# Patient Record
Sex: MALE | Race: OTHER | Employment: FULL TIME | URBAN - METROPOLITAN AREA
[De-identification: names, ages, dates, MRNs, and addresses within clinical notes are randomized per-mention and may not be internally consistent; named-entity substitution may affect disease eponyms.]

---

## 2022-04-12 ENCOUNTER — OFFICE VISIT (OUTPATIENT)
Dept: PRIMARY CARE CLINIC | Age: 42
End: 2022-04-12
Payer: COMMERCIAL

## 2022-04-12 VITALS
HEART RATE: 70 BPM | SYSTOLIC BLOOD PRESSURE: 130 MMHG | BODY MASS INDEX: 23.72 KG/M2 | DIASTOLIC BLOOD PRESSURE: 92 MMHG | OXYGEN SATURATION: 98 % | WEIGHT: 179 LBS | HEIGHT: 73 IN

## 2022-04-12 DIAGNOSIS — L73.9 FOLLICULITIS: ICD-10-CM

## 2022-04-12 DIAGNOSIS — R39.11 URINARY HESITANCY: ICD-10-CM

## 2022-04-12 DIAGNOSIS — I10 PRIMARY HYPERTENSION: Primary | ICD-10-CM

## 2022-04-12 DIAGNOSIS — N52.9 ERECTILE DYSFUNCTION, UNSPECIFIED ERECTILE DYSFUNCTION TYPE: ICD-10-CM

## 2022-04-12 PROCEDURE — 99204 OFFICE O/P NEW MOD 45 MIN: CPT | Performed by: NURSE PRACTITIONER

## 2022-04-12 RX ORDER — LISINOPRIL 5 MG/1
5 TABLET ORAL DAILY
Qty: 30 TABLET | Refills: 5 | Status: SHIPPED | OUTPATIENT
Start: 2022-04-12 | End: 2022-10-07

## 2022-04-12 RX ORDER — TAMSULOSIN HYDROCHLORIDE 0.4 MG/1
0.4 CAPSULE ORAL DAILY
Qty: 90 CAPSULE | Refills: 1 | Status: SHIPPED | OUTPATIENT
Start: 2022-04-12 | End: 2022-10-17

## 2022-04-12 RX ORDER — KETOCONAZOLE 20 MG/ML
SHAMPOO TOPICAL
Qty: 1 EACH | Refills: 0 | Status: SHIPPED | OUTPATIENT
Start: 2022-04-12

## 2022-04-12 SDOH — ECONOMIC STABILITY: TRANSPORTATION INSECURITY
IN THE PAST 12 MONTHS, HAS THE LACK OF TRANSPORTATION KEPT YOU FROM MEDICAL APPOINTMENTS OR FROM GETTING MEDICATIONS?: NO

## 2022-04-12 SDOH — ECONOMIC STABILITY: FOOD INSECURITY: WITHIN THE PAST 12 MONTHS, THE FOOD YOU BOUGHT JUST DIDN'T LAST AND YOU DIDN'T HAVE MONEY TO GET MORE.: NEVER TRUE

## 2022-04-12 SDOH — ECONOMIC STABILITY: FOOD INSECURITY: WITHIN THE PAST 12 MONTHS, YOU WORRIED THAT YOUR FOOD WOULD RUN OUT BEFORE YOU GOT MONEY TO BUY MORE.: NEVER TRUE

## 2022-04-12 SDOH — ECONOMIC STABILITY: TRANSPORTATION INSECURITY
IN THE PAST 12 MONTHS, HAS LACK OF TRANSPORTATION KEPT YOU FROM MEETINGS, WORK, OR FROM GETTING THINGS NEEDED FOR DAILY LIVING?: NO

## 2022-04-12 ASSESSMENT — PATIENT HEALTH QUESTIONNAIRE - PHQ9
SUM OF ALL RESPONSES TO PHQ QUESTIONS 1-9: 0
SUM OF ALL RESPONSES TO PHQ9 QUESTIONS 1 & 2: 0
SUM OF ALL RESPONSES TO PHQ QUESTIONS 1-9: 0
1. LITTLE INTEREST OR PLEASURE IN DOING THINGS: 0
2. FEELING DOWN, DEPRESSED OR HOPELESS: 0

## 2022-04-12 ASSESSMENT — SOCIAL DETERMINANTS OF HEALTH (SDOH): HOW HARD IS IT FOR YOU TO PAY FOR THE VERY BASICS LIKE FOOD, HOUSING, MEDICAL CARE, AND HEATING?: NOT HARD AT ALL

## 2022-04-12 NOTE — PROGRESS NOTES
Forest Gutierrez (:  1980) is a 36 y.o. male,New patient, here for evaluation of the following chief complaint(s):  New Patient (NEW PT), Erectile Dysfunction (PT HAS ED AND HAS AN ENLARGED PROSTATE), and Other (PT HAS FOLLICULTIS ON HIS FACE AND HEAD )    HTN: Couple year history of hypertension. He is a  so has been on multiple following medications: Losartan, metoprolol, amlodipine. Had good blood pressure control with all of them, only side effect was erectile dysfunction. He was a heavy drinker but has recently cut way back to only drinking a few drinks on the weekends. He states he has been off medications for a couple of months and it has been pretty well controlled. Has been checking at home. SBPS 130s but DBPs 80-90s. Denies chest pain, SOB, numbness, tingling, headaches, leg swelling. Pt had blood work completed     ED: Biggest complaint is the ED. Did do a virtual visit with a urologist in January. Has been on max doses of Viagra and Cialis with minimal improvement. Girlfriend present during visits states he has an enlarged prostate but there is no documentation of this. Pt does have some urinary hesitancy and some issues with flow. Skin issues: reports recurrent issue of folliculitis on his head/scalp and face. Unsure of how long it has been present, but girlfriend has been helping for the last two weeks. They have been using a folliculitis and acne wash which has helped a lot. States two weeks ago it was red, inflamed, and pustular. Denies history of issues with acne or this every occurring before. ASSESSMENT/PLAN:  1. Primary hypertension  -     lisinopril (PRINIVIL;ZESTRIL) 5 MG tablet; Take 1 tablet by mouth daily, Disp-30 tablet, R-5Normal  2. Urinary hesitancy  -     tamsulosin (FLOMAX) 0.4 MG capsule; Take 1 capsule by mouth daily, Disp-90 capsule, R-1Normal  -     [facility] - Conchita Wolf MD, Urology, [facility]  3.  Folliculitis  -     External Referral To Dermatology  4. Erectile dysfunction, unspecified erectile dysfunction type  -     AFL - Raymond Menchaca MD, Urology, [facility] [place]      Return in about 3 months (around 7/12/2022) for Med Check, HTN follow-up. Subjective   SUBJECTIVE/OBJECTIVE:    Review of Systems   Constitutional: Negative for appetite change, chills, diaphoresis, fatigue and fever. Respiratory: Negative for cough and shortness of breath. Cardiovascular: Negative for chest pain. Gastrointestinal: Negative for abdominal pain, constipation, diarrhea and vomiting. Genitourinary: Positive for difficulty urinating. Negative for decreased urine volume, penile discharge, penile pain, penile swelling and urgency. Skin: Positive for rash. Neurological: Negative for weakness, numbness and headaches. Psychiatric/Behavioral: Negative for sleep disturbance. The patient is not nervous/anxious. Objective   Physical Exam  Vitals and nursing note reviewed. Constitutional:       Appearance: Normal appearance. He is well-developed and well-groomed. HENT:      Head:        Comments: On bilateral cheeks, similar deep scarring and tunneling. One erythematous inflamed papule. Neck:      Vascular: No carotid bruit. Cardiovascular:      Rate and Rhythm: Normal rate and regular rhythm. Pulses: Normal pulses. Heart sounds: Normal heart sounds, S1 normal and S2 normal.   Pulmonary:      Effort: Pulmonary effort is normal.      Breath sounds: Normal breath sounds and air entry. Neurological:      Mental Status: He is alert. Psychiatric:         Behavior: Behavior is cooperative. An electronic signature was used to authenticate this note.     --Brandie Estevez, APRN - CNP

## 2022-04-13 ASSESSMENT — ENCOUNTER SYMPTOMS
CONSTIPATION: 0
ABDOMINAL PAIN: 0
VOMITING: 0
DIARRHEA: 0
COUGH: 0
SHORTNESS OF BREATH: 0

## 2022-06-03 ENCOUNTER — OFFICE VISIT (OUTPATIENT)
Dept: PRIMARY CARE CLINIC | Age: 42
End: 2022-06-03
Payer: COMMERCIAL

## 2022-06-03 VITALS — SYSTOLIC BLOOD PRESSURE: 130 MMHG | OXYGEN SATURATION: 98 % | HEART RATE: 67 BPM | DIASTOLIC BLOOD PRESSURE: 84 MMHG

## 2022-06-03 DIAGNOSIS — I10 PRIMARY HYPERTENSION: ICD-10-CM

## 2022-06-03 DIAGNOSIS — R53.83 FATIGUE, UNSPECIFIED TYPE: ICD-10-CM

## 2022-06-03 DIAGNOSIS — Z13.1 SCREENING FOR DIABETES MELLITUS: ICD-10-CM

## 2022-06-03 DIAGNOSIS — N52.9 ERECTILE DYSFUNCTION, UNSPECIFIED ERECTILE DYSFUNCTION TYPE: Primary | ICD-10-CM

## 2022-06-03 DIAGNOSIS — N52.9 ERECTILE DYSFUNCTION, UNSPECIFIED ERECTILE DYSFUNCTION TYPE: ICD-10-CM

## 2022-06-03 LAB
FOLATE: 8.6 NG/ML (ref 4.78–24.2)
TSH REFLEX: 1.47 UIU/ML (ref 0.27–4.2)
VITAMIN B-12: 527 PG/ML (ref 211–911)

## 2022-06-03 PROCEDURE — 99214 OFFICE O/P EST MOD 30 MIN: CPT | Performed by: NURSE PRACTITIONER

## 2022-06-03 ASSESSMENT — ENCOUNTER SYMPTOMS
CONSTIPATION: 0
DIARRHEA: 0
ABDOMINAL PAIN: 0
SHORTNESS OF BREATH: 0
COUGH: 0
NAUSEA: 0
VOMITING: 0

## 2022-06-03 NOTE — PROGRESS NOTES
Todd Pisano (:  1980) is a 36 y.o. male,Established patient, here for evaluation of the following chief complaint(s):  Medication Check and Hypertension    HTN: going well. BPs have been stable. No side effects from medications. Feeling good. ED: Still present. Still no libido and poor energy most days. No change since adding flomax and changing to lisinopril. Has appt with Urology today. Folliculitis: a little better with changes to treatment, not fully gone, but improved. Will get bad again when he doesn't use shampoos for a couple of days. Has not scheduled appt with derm, need to determine if they are in network. ASSESSMENT/PLAN:  1. Erectile dysfunction, unspecified erectile dysfunction type  -     Testosterone; Future  -     Testosterone, free, total; Future  -     Sex Hormone Binding Globulin; Future  -     TSH with Reflex; Future  -     Hemoglobin A1C; Future  -     Vitamin B12 & Folate; Future  2. Primary hypertension  -     TSH with Reflex; Future  3. Screening for diabetes mellitus  -     Hemoglobin A1C; Future  4. Fatigue, unspecified type  -     Testosterone; Future  -     Testosterone, free, total; Future  -     Sex Hormone Binding Globulin; Future  -     TSH with Reflex; Future  -     Hemoglobin A1C; Future  -     Vitamin B12 & Folate; Future      Return in about 6 months (around 12/3/2022) for HTN follow-up. -Going up to lab today to have blood drawn.   -Will continue current medication regimen     Subjective   SUBJECTIVE/OBJECTIVE:    Review of Systems   Constitutional: Positive for fatigue. Negative for chills, diaphoresis and fever. Respiratory: Negative for cough and shortness of breath. Cardiovascular: Negative for chest pain, palpitations and leg swelling. Gastrointestinal: Negative for abdominal pain, constipation, diarrhea, nausea and vomiting.    Genitourinary: Negative for decreased urine volume, genital sores, hematuria, penile discharge, penile pain, penile swelling, scrotal swelling, testicular pain and urgency. Objective   Physical Exam  Vitals and nursing note reviewed. Constitutional:       Appearance: Normal appearance. He is well-developed and well-groomed. Neck:      Vascular: No carotid bruit. Cardiovascular:      Rate and Rhythm: Normal rate and regular rhythm. Pulses: Normal pulses. Heart sounds: Normal heart sounds, S1 normal and S2 normal.   Pulmonary:      Effort: Pulmonary effort is normal.      Breath sounds: Normal breath sounds and air entry. Neurological:      Mental Status: He is alert. Psychiatric:         Behavior: Behavior is cooperative. An electronic signature was used to authenticate this note.     --BRYAN Spivey - CNP

## 2022-06-04 LAB
ESTIMATED AVERAGE GLUCOSE: 114 MG/DL
HBA1C MFR BLD: 5.6 %

## 2022-06-05 LAB — SEX HORMONE BINDING GLOBULIN: 40 NMOL/L (ref 17–56)

## 2022-06-07 LAB
SEX HORMONE BINDING GLOBULIN: 37 NMOL/L (ref 11–80)
TESTOSTERONE FREE-NONMALE: 123.7 PG/ML (ref 47–244)
TESTOSTERONE TOTAL: 610 NG/DL (ref 220–1000)

## 2022-06-09 DIAGNOSIS — N52.9 ERECTILE DYSFUNCTION, UNSPECIFIED ERECTILE DYSFUNCTION TYPE: Primary | ICD-10-CM

## 2022-06-09 DIAGNOSIS — N52.9 ERECTILE DYSFUNCTION, UNSPECIFIED ERECTILE DYSFUNCTION TYPE: ICD-10-CM

## 2022-06-09 DIAGNOSIS — R53.83 FATIGUE, UNSPECIFIED TYPE: ICD-10-CM

## 2022-06-09 LAB
FOLLICLE STIMULATING HORMONE: 2.6 MIU/ML
LUTEINIZING HORMONE: 6.7 MIU/ML
PROLACTIN: 3.8 NG/ML

## 2022-06-20 LAB
ESTRADIOL LEVEL: 27.1 PG/ML (ref 10–42)
ESTROGEN TOTAL: 61.6 PG/ML (ref 19–69)
ESTRONE: 34.5 PG/ML (ref 9–36)

## 2022-10-07 DIAGNOSIS — I10 PRIMARY HYPERTENSION: ICD-10-CM

## 2022-10-07 RX ORDER — LISINOPRIL 5 MG/1
TABLET ORAL
Qty: 90 TABLET | Refills: 0 | Status: SHIPPED | OUTPATIENT
Start: 2022-10-07 | End: 2022-11-22 | Stop reason: SDUPTHER

## 2022-10-16 DIAGNOSIS — R39.11 URINARY HESITANCY: ICD-10-CM

## 2022-10-17 RX ORDER — TAMSULOSIN HYDROCHLORIDE 0.4 MG/1
CAPSULE ORAL
Qty: 90 CAPSULE | Refills: 1 | Status: SHIPPED | OUTPATIENT
Start: 2022-10-17

## 2022-11-22 ENCOUNTER — OFFICE VISIT (OUTPATIENT)
Dept: PRIMARY CARE CLINIC | Age: 42
End: 2022-11-22

## 2022-11-22 VITALS
BODY MASS INDEX: 21.47 KG/M2 | OXYGEN SATURATION: 97 % | WEIGHT: 162 LBS | SYSTOLIC BLOOD PRESSURE: 110 MMHG | HEART RATE: 76 BPM | DIASTOLIC BLOOD PRESSURE: 64 MMHG

## 2022-11-22 DIAGNOSIS — N52.9 ERECTILE DYSFUNCTION, UNSPECIFIED ERECTILE DYSFUNCTION TYPE: ICD-10-CM

## 2022-11-22 DIAGNOSIS — R00.2 PALPITATIONS: ICD-10-CM

## 2022-11-22 DIAGNOSIS — I10 PRIMARY HYPERTENSION: Primary | ICD-10-CM

## 2022-11-22 PROCEDURE — 3078F DIAST BP <80 MM HG: CPT | Performed by: NURSE PRACTITIONER

## 2022-11-22 PROCEDURE — 3074F SYST BP LT 130 MM HG: CPT | Performed by: NURSE PRACTITIONER

## 2022-11-22 PROCEDURE — 99213 OFFICE O/P EST LOW 20 MIN: CPT | Performed by: NURSE PRACTITIONER

## 2022-11-22 RX ORDER — LISINOPRIL 5 MG/1
TABLET ORAL
Qty: 90 TABLET | Refills: 0 | Status: SHIPPED | OUTPATIENT
Start: 2022-11-22

## 2022-11-22 ASSESSMENT — ENCOUNTER SYMPTOMS
CONSTIPATION: 0
COUGH: 1
SHORTNESS OF BREATH: 0
VOMITING: 0
ABDOMINAL PAIN: 0
NAUSEA: 0
DIARRHEA: 0

## 2022-11-22 NOTE — PATIENT INSTRUCTIONS
[address] [facility]   [address]  Open 7 am-5 pm Mon.-Fri., Sat. 8 am -12 pm    [address]  [address]  [address]  [facility]  Open 7 am - 5 pm Mon-Fri, and Sat 8 am - [phone]

## 2022-11-22 NOTE — PROGRESS NOTES
Julio Saeed (:  1980) is a 36 y.o. male,Established patient, here for evaluation of the following chief complaint(s):  Hypertension (Htn follow up - ) and Urinary Frequency (Medication check )    HTN: not checking BPs at home. Only thing girlfriend has noticed is that he has increased heart rate and respiration rate at night when he is about to go to sleep. Denies it happening during the day, pt does not feel or notice it, denies chest pain, SOB, headaches, snoring, apneic episodes while sleeping. Some daytime fatigue but not more than his baseline. Rarely drinks anymore, maybe 1 drink on the weekends. Does drink a lot of caffeine, barely any water. Stopped Flomax because urinary frequency resolved. Has been fine off of it for the last 3 months. ASSESSMENT/PLAN:  1. Primary hypertension  -     lisinopril (PRINIVIL;ZESTRIL) 5 MG tablet; TAKE ONE TABLET BY MOUTH DAILY, Disp-90 tablet, R-0Normal  2. Palpitations  3. Erectile dysfunction, unspecified erectile dysfunction type  -     Testosterone, free, total; Future    Return in about 6 months (around 2023) for Annual Physical.     -Will go to lab to get blood work done.   -Discussed possible sleep study, will await until he gets insurance.   -Will trial off BP medications and/or taking at bedtime to see if it helps with increased heart rate. Subjective   SUBJECTIVE/OBJECTIVE:      Review of Systems   Constitutional:  Positive for fatigue. Negative for appetite change, chills, diaphoresis and fever. Respiratory:  Positive for cough. Negative for shortness of breath. Cardiovascular:  Negative for chest pain, palpitations and leg swelling. Gastrointestinal:  Negative for abdominal pain, constipation, diarrhea, nausea and vomiting. Neurological:  Negative for dizziness, weakness and headaches. Psychiatric/Behavioral:  Negative for sleep disturbance. Objective   Physical Exam  Vitals and nursing note reviewed. Constitutional:       Appearance: Normal appearance. He is well-developed and well-groomed. Neck:      Vascular: No carotid bruit. Cardiovascular:      Rate and Rhythm: Normal rate and regular rhythm. Heart sounds: Normal heart sounds, S1 normal and S2 normal.   Pulmonary:      Effort: Pulmonary effort is normal.      Breath sounds: Normal breath sounds and air entry. Musculoskeletal:      Right lower leg: No edema. Left lower leg: No edema. Neurological:      Mental Status: He is alert. Psychiatric:         Attention and Perception: Attention normal.         Behavior: Behavior normal. Behavior is cooperative. An electronic signature was used to authenticate this note.     --BRYAN Multani - CNP

## 2022-12-30 DIAGNOSIS — N52.9 ERECTILE DYSFUNCTION, UNSPECIFIED ERECTILE DYSFUNCTION TYPE: ICD-10-CM

## 2023-01-04 LAB
SEX HORMONE BINDING GLOBULIN: 22 NMOL/L (ref 11–80)
TESTOSTERONE FREE-NONMALE: 73.5 PG/ML (ref 47–244)
TESTOSTERONE TOTAL: 301 NG/DL (ref 220–1000)